# Patient Record
Sex: MALE | Race: WHITE
[De-identification: names, ages, dates, MRNs, and addresses within clinical notes are randomized per-mention and may not be internally consistent; named-entity substitution may affect disease eponyms.]

---
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
## 2021-10-20 ENCOUNTER — HOSPITAL ENCOUNTER (EMERGENCY)
Dept: HOSPITAL 47 - EC | Age: 21
Discharge: HOME | End: 2021-10-20
Payer: COMMERCIAL

## 2021-10-20 VITALS
HEART RATE: 56 BPM | SYSTOLIC BLOOD PRESSURE: 136 MMHG | DIASTOLIC BLOOD PRESSURE: 77 MMHG | RESPIRATION RATE: 16 BRPM | TEMPERATURE: 98.7 F

## 2021-10-20 DIAGNOSIS — N13.2: Primary | ICD-10-CM

## 2021-10-20 DIAGNOSIS — J45.909: ICD-10-CM

## 2021-10-20 LAB
ALBUMIN SERPL-MCNC: 4.3 G/DL (ref 3.5–5)
ALP SERPL-CCNC: 57 U/L (ref 38–126)
ALT SERPL-CCNC: 14 U/L (ref 4–49)
ANION GAP SERPL CALC-SCNC: 7 MMOL/L
AST SERPL-CCNC: 25 U/L (ref 17–59)
BASOPHILS # BLD AUTO: 0.1 K/UL (ref 0–0.2)
BASOPHILS NFR BLD AUTO: 0 %
BUN SERPL-SCNC: 10 MG/DL (ref 9–20)
CALCIUM SPEC-MCNC: 9.8 MG/DL (ref 8.4–10.2)
CHLORIDE SERPL-SCNC: 104 MMOL/L (ref 98–107)
CO2 SERPL-SCNC: 28 MMOL/L (ref 22–30)
EOSINOPHIL # BLD AUTO: 0.2 K/UL (ref 0–0.7)
EOSINOPHIL NFR BLD AUTO: 2 %
ERYTHROCYTE [DISTWIDTH] IN BLOOD BY AUTOMATED COUNT: 4.49 M/UL (ref 4.3–5.9)
ERYTHROCYTE [DISTWIDTH] IN BLOOD: 12.3 % (ref 11.5–15.5)
GLUCOSE SERPL-MCNC: 116 MG/DL (ref 74–99)
HCT VFR BLD AUTO: 42.1 % (ref 39–53)
HGB BLD-MCNC: 13.8 GM/DL (ref 13–17.5)
LYMPHOCYTES # SPEC AUTO: 1.7 K/UL (ref 1–4.8)
LYMPHOCYTES NFR SPEC AUTO: 11 %
MCH RBC QN AUTO: 30.7 PG (ref 25–35)
MCHC RBC AUTO-ENTMCNC: 32.7 G/DL (ref 31–37)
MCV RBC AUTO: 93.7 FL (ref 80–100)
MONOCYTES # BLD AUTO: 0.9 K/UL (ref 0–1)
MONOCYTES NFR BLD AUTO: 6 %
NEUTROPHILS # BLD AUTO: 11.7 K/UL (ref 1.3–7.7)
NEUTROPHILS NFR BLD AUTO: 79 %
PH UR: 6 [PH] (ref 5–8)
PLATELET # BLD AUTO: 186 K/UL (ref 150–450)
POTASSIUM SERPL-SCNC: 4.4 MMOL/L (ref 3.5–5.1)
PROT SERPL-MCNC: 7.2 G/DL (ref 6.3–8.2)
RBC UR QL: >182 /HPF (ref 0–5)
SODIUM SERPL-SCNC: 139 MMOL/L (ref 137–145)
SP GR UR: 1.02 (ref 1–1.03)
UROBILINOGEN UR QL STRIP: <2 MG/DL (ref ?–2)
WBC # BLD AUTO: 14.8 K/UL (ref 4–11)
WBC # UR AUTO: 7 /HPF (ref 0–5)

## 2021-10-20 PROCEDURE — 81001 URINALYSIS AUTO W/SCOPE: CPT

## 2021-10-20 PROCEDURE — 80053 COMPREHEN METABOLIC PANEL: CPT

## 2021-10-20 PROCEDURE — 36415 COLL VENOUS BLD VENIPUNCTURE: CPT

## 2021-10-20 PROCEDURE — 99284 EMERGENCY DEPT VISIT MOD MDM: CPT

## 2021-10-20 PROCEDURE — 74176 CT ABD & PELVIS W/O CONTRAST: CPT

## 2021-10-20 PROCEDURE — 85025 COMPLETE CBC W/AUTO DIFF WBC: CPT

## 2021-10-20 PROCEDURE — 96374 THER/PROPH/DIAG INJ IV PUSH: CPT

## 2021-10-20 PROCEDURE — 96375 TX/PRO/DX INJ NEW DRUG ADDON: CPT

## 2021-10-20 PROCEDURE — 96361 HYDRATE IV INFUSION ADD-ON: CPT

## 2021-10-20 NOTE — CT
EXAMINATION TYPE: CT abdomen pelvis wo con

 

DATE OF EXAM: 10/20/2021

 

COMPARISON: None

 

HISTORY: left flank pian

 

CT DLP: 296.6 mGycm

Automated exposure control for dose reduction was used.

 

TECHNIQUE:  Helical acquisition of images was performed from the lung bases through the pelvis. The r
adiologist on call requested that indicates is present in the 7:59 PM at 10/20/2021

 

FINDINGS: 

 

LUNG BASES: No significant abnormality is appreciated.

 

LIVER/GB: No significant abnormality is appreciated.

 

PANCREAS: No significant abnormality is seen.

 

SPLEEN: No significant abnormality is seen.

 

ADRENALS: No significant abnormality is seen.

 

KIDNEYS: There is a 3 mm calcification at the left UVJ with moderate left hydronephrosis.

There is a punctate 1 mm lower pole right renal calculus..

 

ADENOPATHY:  None visualized.

 

OSSEOUS STRUCTURES:  No significant abnormality is seen.

 

BOWEL:  No significant abnormality is seen.

 

OTHER: Small amount of free fluid in the pelvis.

 

IMPRESSION: 

MODERATE LEFT HYDRONEPHROSIS SECONDARY TO DISTAL 4 MM LEFT RENAL CALCULUS. NONOBSTRUCTING PUNCTATE 1 
TO 2 MM RIGHT RENAL CALCULUS.

 

APPENDIX IS NOT VISUALIZED.

## 2021-10-20 NOTE — ED
Male Urogenital HPI





- General


Chief complaint: Urogenital


Stated complaint: Urogenital


Time Seen by Provider: 10/20/21 15:42


Source: patient, RN notes reviewed


Mode of arrival: ambulatory


Limitations: no limitations





- History of Present Illness


Initial comments: 





Patient is a 20-year-old male presenting to emergency Department with complaints

of painful urination that started yesterday.  Patient also reports that left-

sided flank pain that started early this morning as well as episodes of nausea 

and vomiting.  He denies any fevers or chills.  He denies any discharge, he 

denies being sexually active at this time, no concerns for STDs.  He denies his

tory of kidney stones.  Denies any chest pain or shortness of breath.  He has no

further complaints at this time.





- Related Data


                                  Previous Rx's











 Medication  Instructions  Recorded


 


Ketorolac [Toradol] 10 mg PO Q8HR #15 tab 10/20/21


 


Ondansetron Odt [Zofran Odt] 4 mg PO Q8HR PRN #10 tab 10/20/21


 


Tamsulosin [Flomax] 0.4 mg PO DAILY #5 cap 10/20/21











                                    Allergies











Allergy/AdvReac Type Severity Reaction Status Date / Time


 


No Known Allergies Allergy   Verified 10/20/21 18:04














Review of Systems


ROS Statement: 


Those systems with pertinent positive or pertinent negative responses have been 

documented in the HPI.





ROS Other: All systems not noted in ROS Statement are negative.





Past Medical History


Past Medical History: Asthma


History of Any Multi-Drug Resistant Organisms: None Reported


Past Surgical History: No Surgical Hx Reported


Past Psychological History: No Psychological Hx Reported, Depression


Smoking Status: Never smoker


Past Alcohol Use History: Rare


Past Drug Use History: Marijuana





General Exam





- General Exam Comments


Initial Comments: 





GENERAL: 


Patient is well-developed and well-nourished.  Patient is nontoxic and in no 

acute distress.





HEAD: 


Atraumatic, normocephalic.





EYES:


Pupils equal round and reactive to light, extraocular movements intact, sclera 

anicteric, conjunctiva are normal.  Eyelids were unremarkable.





ENT: 


Moist mucous membranes.





NECK: 


Normal range of motion, supple without lymphadenopathy or JVD.





LUNGS:


Unlabored respirations.  Breath sounds clear to auscultation bilaterally and 

equal.  No wheezes rales or rhonchi.





HEART:


Regular rate and rhythm without murmurs, rubs or gallops.





ABDOMEN: 


Soft, nontender, normoactive bowel sounds.  No guarding, no rebound.  No masses 

appreciated.  Mild left flank pain on palpation.





: Deferred 





MUSCULOSKELETAL: 


Normal extremities with adequate strength and normal range of motion, no pitting

 or edema.  No clubbing or cyanosis.





NEUROLOGICAL: 


Patient is alert and oriented x 3.  





SKIN:


 Warm, Dry, normal turgor, no rashes or lesions noted.


Limitations: no limitations





Course


                                   Vital Signs











  10/20/21





  15:10


 


Temperature 98.7 F


 


Pulse Rate 56 L


 


Respiratory 16





Rate 


 


Blood Pressure 136/77


 


O2 Sat by Pulse 98





Oximetry 














Medical Decision Making





- Medical Decision Making





Patient is a 20-year-old male here with dysuria, left flank pain, nausea and 

vomiting that started yesterday.  His vitals are stable, no fevers.  At shows 

slight white count 14.8, rest of labs within normal limits.  Urine shows a large

 amount of blood, no signs of bacteria.  CT the abdomen and pelvis shows a 3 mm 

calcification at the left UVJ with moderate hydronephrosis, is also a small 

stone in the right kidney.  No other abnormality seen.  Patient received fluids,

 Toradol and Zofran and has been resting completely.  Currently he is pain-free.

  Discussed these findings with him.  He was sent home with pain control, Flomax

 and Zofran.  He is agreeable splenic care.  He'll follow-up with his doctor.  R

eturn parameters were discussed with him and he verbalized understanding.  Case 

discussed with Dr. Monterroso.





- Lab Data


Result diagrams: 


                                 10/20/21 17:03





                                 10/20/21 17:03


                                   Lab Results











  10/20/21 10/20/21 10/20/21 Range/Units





  15:13 17:03 17:03 


 


WBC   14.8 H   (4.0-11.0)  k/uL


 


RBC   4.49   (4.30-5.90)  m/uL


 


Hgb   13.8   (13.0-17.5)  gm/dL


 


Hct   42.1   (39.0-53.0)  %


 


MCV   93.7   (80.0-100.0)  fL


 


MCH   30.7   (25.0-35.0)  pg


 


MCHC   32.7   (31.0-37.0)  g/dL


 


RDW   12.3   (11.5-15.5)  %


 


Plt Count   186   (150-450)  k/uL


 


MPV   9.3   


 


Neutrophils %   79   %


 


Lymphocytes %   11   %


 


Monocytes %   6   %


 


Eosinophils %   2   %


 


Basophils %   0   %


 


Neutrophils #   11.7 H   (1.3-7.7)  k/uL


 


Lymphocytes #   1.7   (1.0-4.8)  k/uL


 


Monocytes #   0.9   (0-1.0)  k/uL


 


Eosinophils #   0.2   (0-0.7)  k/uL


 


Basophils #   0.1   (0-0.2)  k/uL


 


Sodium    139  (137-145)  mmol/L


 


Potassium    4.4  (3.5-5.1)  mmol/L


 


Chloride    104  ()  mmol/L


 


Carbon Dioxide    28  (22-30)  mmol/L


 


Anion Gap    7  mmol/L


 


BUN    10  (9-20)  mg/dL


 


Creatinine    1.03  (0.66-1.25)  mg/dL


 


Est GFR (CKD-EPI)AfAm    >90  (>60 ml/min/1.73 sqM)  


 


Est GFR (CKD-EPI)NonAf    >90  (>60 ml/min/1.73 sqM)  


 


Glucose    116 H  (74-99)  mg/dL


 


Calcium    9.8  (8.4-10.2)  mg/dL


 


Total Bilirubin    1.4 H  (0.2-1.3)  mg/dL


 


AST    25  (17-59)  U/L


 


ALT    14  (4-49)  U/L


 


Alkaline Phosphatase    57  ()  U/L


 


Total Protein    7.2  (6.3-8.2)  g/dL


 


Albumin    4.3  (3.5-5.0)  g/dL


 


Urine Color  Yellow    


 


Urine Appearance  Clear    (Clear)  


 


Urine pH  6.0    (5.0-8.0)  


 


Ur Specific Gravity  1.019    (1.001-1.035)  


 


Urine Protein  Trace H    (Negative)  


 


Urine Glucose (UA)  Negative    (Negative)  


 


Urine Ketones  Negative    (Negative)  


 


Urine Blood  Large H    (Negative)  


 


Urine Nitrite  Negative    (Negative)  


 


Urine Bilirubin  Negative    (Negative)  


 


Urine Urobilinogen  <2.0    (<2.0)  mg/dL


 


Ur Leukocyte Esterase  Negative    (Negative)  


 


Urine RBC  >182 H    (0-5)  /hpf


 


Urine WBC  7 H    (0-5)  /hpf


 


Urine Mucus  Rare H    (None)  /hpf














Disposition


Clinical Impression: 


 Left ureteral stone





Disposition: HOME SELF-CARE


Condition: Stable


Instructions (If sedation given, give patient instructions):  Kidney Stones (ED)


Additional Instructions: 


Please return to the Emergency Department if symptoms worsen or any other 

concerns.


Take medications as prescribed, as needed for pain or discomfort.  


Follow-up with your primary care.


Prescriptions: 


Tamsulosin [Flomax] 0.4 mg PO DAILY #5 cap


Ketorolac [Toradol] 10 mg PO Q8HR #15 tab


Ondansetron Odt [Zofran Odt] 4 mg PO Q8HR PRN #10 tab


 PRN Reason: Nausea


Is patient prescribed a controlled substance at d/c from ED?: No


Referrals: 


None,Stated [Primary Care Provider] - 1-2 days


Time of Disposition: 18:54
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
